# Patient Record
Sex: MALE | Race: WHITE | URBAN - METROPOLITAN AREA
[De-identification: names, ages, dates, MRNs, and addresses within clinical notes are randomized per-mention and may not be internally consistent; named-entity substitution may affect disease eponyms.]

---

## 2017-01-11 RX ORDER — OMEGA-3-ACID ETHYL ESTERS 1 G/1
CAPSULE, LIQUID FILLED ORAL
Qty: 120 CAPSULE | Refills: 1 | Status: SHIPPED | OUTPATIENT
Start: 2017-01-11 | End: 2017-08-07 | Stop reason: SDUPTHER

## 2017-02-20 RX ORDER — LISINOPRIL 10 MG/1
10 TABLET ORAL DAILY
Qty: 30 TABLET | Refills: 0 | Status: SHIPPED | OUTPATIENT
Start: 2017-02-20

## 2017-02-20 RX ORDER — ATORVASTATIN CALCIUM 40 MG/1
TABLET, FILM COATED ORAL
Qty: 30 TABLET | Refills: 0 | Status: SHIPPED | OUTPATIENT
Start: 2017-02-20 | End: 2017-03-20 | Stop reason: SDUPTHER

## 2017-04-17 RX ORDER — ATORVASTATIN CALCIUM 40 MG/1
TABLET, FILM COATED ORAL
Qty: 30 TABLET | Refills: 0 | Status: SHIPPED | OUTPATIENT
Start: 2017-04-17 | End: 2017-05-19 | Stop reason: SDUPTHER

## 2017-05-19 RX ORDER — ATORVASTATIN CALCIUM 40 MG/1
40 TABLET, FILM COATED ORAL DAILY
Qty: 30 TABLET | Refills: 0 | Status: SHIPPED | OUTPATIENT
Start: 2017-05-19 | End: 2018-01-16 | Stop reason: SDUPTHER

## 2017-06-22 LAB
ALBUMIN SERPL-MCNC: 4.1 G/DL
ALP BLD-CCNC: 75 U/L
ALT SERPL-CCNC: 24 U/L
ANA TITER: 0.2
AST SERPL-CCNC: 16 U/L
BASOPHILS ABSOLUTE: 0 /ΜL
BASOPHILS RELATIVE PERCENT: 0 %
BILIRUB SERPL-MCNC: 1.7 MG/DL (ref 0.1–1.4)
BUN BLDV-MCNC: 20 MG/DL
CALCIUM SERPL-MCNC: 99 MG/DL
CHLORIDE BLD-SCNC: 99 MMOL/L
CO2: 28 MMOL/L
CREAT SERPL-MCNC: 1.37 MG/DL
EOSINOPHILS ABSOLUTE: 0 /ΜL
EOSINOPHILS RELATIVE PERCENT: 0 %
GFR CALCULATED: ABNORMAL
GLUCOSE BLD-MCNC: 117 MG/DL
HCT VFR BLD CALC: 40 % (ref 41–53)
HEMOGLOBIN: 13.7 G/DL (ref 13.5–17.5)
LYMPHOCYTES ABSOLUTE: 1.1 /ΜL
LYMPHOCYTES RELATIVE PERCENT: 13 %
MCH RBC QN AUTO: 34 PG
MCHC RBC AUTO-ENTMCNC: ABNORMAL G/DL
MCV RBC AUTO: 93 FL
MONOCYTES ABSOLUTE: 0.4 /ΜL
MONOCYTES RELATIVE PERCENT: 4 %
NEUTROPHILS ABSOLUTE: ABNORMAL /ΜL
NEUTROPHILS RELATIVE PERCENT: ABNORMAL %
PLATELET # BLD: 229 K/ΜL
PMV BLD AUTO: ABNORMAL FL
POTASSIUM SERPL-SCNC: 4.5 MMOL/L
RBC # BLD: 4.3 10^6/ΜL
SODIUM BLD-SCNC: 135 MMOL/L
TOTAL PROTEIN: 7.5
TSH SERPL DL<=0.05 MIU/L-ACNC: 1.5 UIU/ML
WBC # BLD: 8.9 10^3/ML

## 2017-06-26 LAB
CHOLESTEROL, TOTAL: 95 MG/DL
CHOLESTEROL/HDL RATIO: NORMAL
HDLC SERPL-MCNC: 45 MG/DL (ref 35–70)
LDL CHOLESTEROL CALCULATED: 37 MG/DL (ref 0–160)
TRIGL SERPL-MCNC: NORMAL MG/DL
VLDLC SERPL CALC-MCNC: NORMAL MG/DL

## 2017-08-01 ENCOUNTER — OFFICE VISIT (OUTPATIENT)
Dept: ENDOCRINOLOGY | Age: 77
End: 2017-08-01

## 2017-08-01 VITALS
SYSTOLIC BLOOD PRESSURE: 125 MMHG | HEIGHT: 70 IN | WEIGHT: 184.8 LBS | OXYGEN SATURATION: 98 % | BODY MASS INDEX: 26.45 KG/M2 | RESPIRATION RATE: 14 BRPM | DIASTOLIC BLOOD PRESSURE: 86 MMHG | HEART RATE: 73 BPM

## 2017-08-01 DIAGNOSIS — R73.01 IMPAIRED FASTING BLOOD SUGAR: ICD-10-CM

## 2017-08-01 DIAGNOSIS — E55.9 VITAMIN D DEFICIENCY: ICD-10-CM

## 2017-08-01 DIAGNOSIS — E78.00 PURE HYPERCHOLESTEROLEMIA: Primary | ICD-10-CM

## 2017-08-01 PROCEDURE — 99214 OFFICE O/P EST MOD 30 MIN: CPT | Performed by: INTERNAL MEDICINE

## 2017-08-01 PROCEDURE — G8419 CALC BMI OUT NRM PARAM NOF/U: HCPCS | Performed by: INTERNAL MEDICINE

## 2017-08-01 PROCEDURE — 4040F PNEUMOC VAC/ADMIN/RCVD: CPT | Performed by: INTERNAL MEDICINE

## 2017-08-01 PROCEDURE — G8427 DOCREV CUR MEDS BY ELIG CLIN: HCPCS | Performed by: INTERNAL MEDICINE

## 2017-08-01 PROCEDURE — 1036F TOBACCO NON-USER: CPT | Performed by: INTERNAL MEDICINE

## 2017-08-01 PROCEDURE — 1123F ACP DISCUSS/DSCN MKR DOCD: CPT | Performed by: INTERNAL MEDICINE

## 2017-08-01 PROCEDURE — G8598 ASA/ANTIPLAT THER USED: HCPCS | Performed by: INTERNAL MEDICINE

## 2017-08-01 RX ORDER — RANITIDINE 150 MG/1
150 TABLET ORAL 2 TIMES DAILY
COMMUNITY

## 2017-08-01 RX ORDER — TRIAMCINOLONE ACETONIDE 1 MG/G
CREAM TOPICAL 2 TIMES DAILY
COMMUNITY
End: 2018-08-06 | Stop reason: ALTCHOICE

## 2017-08-01 RX ORDER — TACROLIMUS 1 MG/G
OINTMENT TOPICAL 2 TIMES DAILY
COMMUNITY
End: 2018-08-06 | Stop reason: ALTCHOICE

## 2017-08-01 RX ORDER — CETIRIZINE HYDROCHLORIDE 10 MG/1
10 TABLET ORAL DAILY
COMMUNITY

## 2017-08-07 RX ORDER — OMEGA-3-ACID ETHYL ESTERS 1 G/1
CAPSULE, LIQUID FILLED ORAL
Qty: 120 CAPSULE | Refills: 1 | Status: SHIPPED | OUTPATIENT
Start: 2017-08-07 | End: 2017-09-06 | Stop reason: SDUPTHER

## 2017-09-06 ENCOUNTER — TELEPHONE (OUTPATIENT)
Dept: ENDOCRINOLOGY | Age: 77
End: 2017-09-06

## 2017-09-06 RX ORDER — OMEGA-3-ACID ETHYL ESTERS 1 G/1
CAPSULE, LIQUID FILLED ORAL
Qty: 120 CAPSULE | Refills: 1 | Status: SHIPPED | OUTPATIENT
Start: 2017-09-06 | End: 2017-12-05 | Stop reason: SDUPTHER

## 2017-12-04 NOTE — TELEPHONE ENCOUNTER
Pt called to get a refill on Lovaza. He would like it called to [facility]. @ [phone]. If any questions or problems please call patient on his cell.

## 2017-12-05 RX ORDER — OMEGA-3-ACID ETHYL ESTERS 1 G/1
CAPSULE, LIQUID FILLED ORAL
Qty: 120 CAPSULE | Refills: 2 | Status: SHIPPED | OUTPATIENT
Start: 2017-12-05 | End: 2018-01-06 | Stop reason: SDUPTHER

## 2018-01-08 RX ORDER — OMEGA-3-ACID ETHYL ESTERS 1 G/1
CAPSULE, LIQUID FILLED ORAL
Qty: 120 CAPSULE | Refills: 1 | Status: SHIPPED | OUTPATIENT
Start: 2018-01-08 | End: 2018-02-28 | Stop reason: SDUPTHER

## 2018-01-17 RX ORDER — ATORVASTATIN CALCIUM 40 MG/1
TABLET, FILM COATED ORAL
Qty: 30 TABLET | Refills: 0 | Status: SHIPPED | OUTPATIENT
Start: 2018-01-17 | End: 2018-02-06 | Stop reason: SDUPTHER

## 2018-02-06 ENCOUNTER — OFFICE VISIT (OUTPATIENT)
Dept: ENDOCRINOLOGY | Age: 78
End: 2018-02-06

## 2018-02-06 VITALS
OXYGEN SATURATION: 100 % | BODY MASS INDEX: 26.23 KG/M2 | RESPIRATION RATE: 14 BRPM | DIASTOLIC BLOOD PRESSURE: 71 MMHG | SYSTOLIC BLOOD PRESSURE: 106 MMHG | WEIGHT: 183.2 LBS | HEIGHT: 70 IN | HEART RATE: 73 BPM

## 2018-02-06 DIAGNOSIS — I10 ESSENTIAL HYPERTENSION: ICD-10-CM

## 2018-02-06 DIAGNOSIS — R73.01 IMPAIRED FASTING BLOOD SUGAR: ICD-10-CM

## 2018-02-06 DIAGNOSIS — E55.9 VITAMIN D DEFICIENCY: ICD-10-CM

## 2018-02-06 DIAGNOSIS — E78.00 PURE HYPERCHOLESTEROLEMIA: Primary | ICD-10-CM

## 2018-02-06 DIAGNOSIS — M10.9 GOUT, UNSPECIFIED CAUSE, UNSPECIFIED CHRONICITY, UNSPECIFIED SITE: ICD-10-CM

## 2018-02-06 DIAGNOSIS — E78.00 PURE HYPERCHOLESTEROLEMIA: ICD-10-CM

## 2018-02-06 LAB
A/G RATIO: 2 (ref 1.1–2.2)
ALBUMIN SERPL-MCNC: 4.6 G/DL (ref 3.4–5)
ALP BLD-CCNC: 78 U/L (ref 40–129)
ALT SERPL-CCNC: 12 U/L (ref 10–40)
ANION GAP SERPL CALCULATED.3IONS-SCNC: 13 MMOL/L (ref 3–16)
AST SERPL-CCNC: 17 U/L (ref 15–37)
BILIRUB SERPL-MCNC: 2.1 MG/DL (ref 0–1)
BUN BLDV-MCNC: 22 MG/DL (ref 7–20)
CALCIUM SERPL-MCNC: 9.8 MG/DL (ref 8.3–10.6)
CHLORIDE BLD-SCNC: 98 MMOL/L (ref 99–110)
CHOLESTEROL, TOTAL: 95 MG/DL (ref 0–199)
CO2: 28 MMOL/L (ref 21–32)
CREAT SERPL-MCNC: 1 MG/DL (ref 0.8–1.3)
GFR AFRICAN AMERICAN: >60
GFR NON-AFRICAN AMERICAN: >60
GLOBULIN: 2.3 G/DL
GLUCOSE BLD-MCNC: 97 MG/DL (ref 70–99)
HDLC SERPL-MCNC: 41 MG/DL (ref 40–60)
LDL CHOLESTEROL CALCULATED: 42 MG/DL
POTASSIUM SERPL-SCNC: 4.4 MMOL/L (ref 3.5–5.1)
SODIUM BLD-SCNC: 139 MMOL/L (ref 136–145)
TOTAL PROTEIN: 6.9 G/DL (ref 6.4–8.2)
TRIGL SERPL-MCNC: 60 MG/DL (ref 0–150)
URIC ACID, SERUM: 4.9 MG/DL (ref 3.5–7.2)
VITAMIN D 25-HYDROXY: 35.9 NG/ML
VLDLC SERPL CALC-MCNC: 12 MG/DL

## 2018-02-06 PROCEDURE — G8484 FLU IMMUNIZE NO ADMIN: HCPCS | Performed by: INTERNAL MEDICINE

## 2018-02-06 PROCEDURE — G8419 CALC BMI OUT NRM PARAM NOF/U: HCPCS | Performed by: INTERNAL MEDICINE

## 2018-02-06 PROCEDURE — 99214 OFFICE O/P EST MOD 30 MIN: CPT | Performed by: INTERNAL MEDICINE

## 2018-02-06 PROCEDURE — G8427 DOCREV CUR MEDS BY ELIG CLIN: HCPCS | Performed by: INTERNAL MEDICINE

## 2018-02-06 PROCEDURE — 1123F ACP DISCUSS/DSCN MKR DOCD: CPT | Performed by: INTERNAL MEDICINE

## 2018-02-06 PROCEDURE — G8598 ASA/ANTIPLAT THER USED: HCPCS | Performed by: INTERNAL MEDICINE

## 2018-02-06 PROCEDURE — 1036F TOBACCO NON-USER: CPT | Performed by: INTERNAL MEDICINE

## 2018-02-06 PROCEDURE — 4040F PNEUMOC VAC/ADMIN/RCVD: CPT | Performed by: INTERNAL MEDICINE

## 2018-02-06 RX ORDER — ATORVASTATIN CALCIUM 40 MG/1
TABLET, FILM COATED ORAL
Qty: 30 TABLET | Refills: 5 | Status: SHIPPED | OUTPATIENT
Start: 2018-02-06 | End: 2018-08-06 | Stop reason: SDUPTHER

## 2018-02-06 NOTE — PROGRESS NOTES
10/20/16 190 lb 9.6 oz (86.5 kg)     BP Readings from Last 3 Encounters:   02/06/18 106/71   08/01/17 125/86   10/20/16 115/78        Past Medical History:   Diagnosis Date    Atrial fibrillation ([address] 2000    pacemaker    CAD (coronary artery disease)     angiogram 12/2010 no stents    Coagulopathy (HCC)     4G/5G ELISSA-1, Heterozygous Prothrombin    Hyperlipidemia     Hypertension     MI (myocardial infarction) 1979    Unspecified sleep apnea      Past Surgical History:   Procedure Laterality Date    ANGIOPLASTY  1986    CARDIAC CATHETERIZATION  12/2010     History reviewed. No pertinent family history. History   Smoking Status    Former Smoker    Years: 31.00   Smokeless Tobacco    Never Used      History   Alcohol Use No       HPI    This is a 68 yrs old male who is here for a follow-up for management of hyperlipidemia, vit d def, hyperuricemia. PCP Antelmo Soto    Was previously seeing Dr. Rosamaria Jimenez at the [facility]. He has a PMH of  A. Fibrillation (On Xarelto), Left Ventricular dysfunction (s/p pacemaker), CAD (MI, LAD 1979, then angioplasty 1986), KARISSA (CPAP/BIPAP), Hyperuricemia.      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 On Xarelto 20mg qD. No strokes/TIA.     Denies myalgias, CP and SOB, GI symptoms.     Hyperlipidemia : Lovaza 4g daily, Lipitor 40mg daily,   Vit D def. Vit D 7230 IU daily,   Folic acid 1mg daily. Hyperuricemia : allopurinol. Review of Systems   Constitutional: Positive for malaise/fatigue. Negative for chills, diaphoresis, fever and weight loss. Eyes: Negative for blurred vision, double vision and photophobia. Respiratory: Negative for cough and hemoptysis. Cardiovascular: Negative for chest pain, palpitations and orthopnea. Genitourinary: Negative for dysuria, flank pain, frequency, hematuria and urgency. Musculoskeletal: Negative for back pain, falls, joint pain, myalgias and neck pain.    Skin: Negative for itching and rash. Neurological: Negative for dizziness, tingling, tremors, sensory change, speech change, focal weakness, seizures, loss of consciousness and headaches. Endo/Heme/Allergies: Negative for environmental allergies and polydipsia. Does not bruise/bleed easily. Psychiatric/Behavioral: Negative for depression, hallucinations, memory loss, substance abuse and suicidal ideas. The patient is not nervous/anxious and does not have insomnia. Physical Exam   Constitutional: He is oriented to person, place, and time. He appears well-developed. No distress. HENT:   Mouth/Throat: Oropharynx is clear and moist.   Eyes: EOM are normal.   Neck: No thyromegaly present. Cardiovascular: Normal rate and normal heart sounds. Pulmonary/Chest: Effort normal. No respiratory distress. He has no wheezes. Abdominal: Soft. Bowel sounds are normal. There is no tenderness. Musculoskeletal: He exhibits no edema. Neurological: He is alert and oriented to person, place, and time. Skin: Skin is warm and dry. He is not diaphoretic. Psychiatric: His behavior is normal. Thought content normal.     No visits with results within 1 Month(s) from this visit.    Latest known visit with results is:   Abstract on 07/03/2017   Component Date Value Ref Range Status    DONITA Titer 06/22/2017 0.2   Final    TSH 06/22/2017 1.503  uIU/mL Final    WBC 06/22/2017 8.9  10^3/mL Final    Hemoglobin 06/22/2017 13.7  13.5 - 17.5 g/dL Final    Hematocrit 06/22/2017 40* 41 - 53 % Final    Platelets 92/01/3469 229  K/µL Final    Lymphocytes % 06/22/2017 13  % Final    Monocytes % 06/22/2017 4  % Final    Eosinophils % 06/22/2017 0  % Final    Basophils % 06/22/2017 0  % Final    Lymphocytes # 06/22/2017 1.1  /µL Final    Monocytes # 06/22/2017 0.4  /µL Final    Eosinophils # 06/22/2017 0  /µL Final    Basophils # 06/22/2017 0  /µL Final    RBC 06/22/2017 4.30  10^6/µL Final    MCV 06/22/2017 93  fL Final    MCH 06/22/2017 34  pg Final    Sodium 06/22/2017 135  mmol/L Final    Chloride 06/22/2017 99  mmol/L Final    Potassium 06/22/2017 4.5  mmol/L Final    BUN 06/22/2017 20  mg/dL Final    CREATININE 06/22/2017 1.37   Final    Glucose 06/22/2017 117  mg/dL Final    AST 06/22/2017 16  U/L Final    ALT 06/22/2017 24  U/L Final    Calcium 06/22/2017 99  mg/dL Final    Total Protein 06/22/2017 7.5   Final    CO2 06/22/2017 28  mmol/L Final    Alb 06/22/2017 4.1   Final    Alkaline Phosphatase 06/22/2017 75  U/L Final    Total Bilirubin 06/22/2017 1.70* 0.1 - 1.4 mg/dL Final    Cholesterol, Total 06/26/2017 95  mg/dL Final    HDL 06/26/2017 45  35 - 70 mg/dL Final    LDL Calculated 06/26/2017 37  0 - 160 mg/dL Final       Assessment/Plan      1. Hyperlipidemia    This 68 yrs old male has h/o hyperlipidemia. On lipitor, Pérez    Will repeat labs    2. Vit D def On replacement. Levels normal in 10/16. 3. Impaired fasting glucose. 117. Discussed life style changes      4. Hyperuricemia. On allopurinol. 5. CAD/Afib/prothrombin gene mutation. On xarelto. As per cardiology. 6. Sleep Apnea. As per pulmonology medicine. 7. Dermatitis/Eczema. On tacrolimus, kenalog. He is living in [place] now. Travels to different towns  Is seen when he is in town. More than 25 minutess spent with patient with > 50 % time in counseling and coordination of care.

## 2018-02-07 LAB
ESTIMATED AVERAGE GLUCOSE: 122.6 MG/DL
HBA1C MFR BLD: 5.9 %

## 2018-02-28 RX ORDER — OMEGA-3-ACID ETHYL ESTERS 1 G/1
CAPSULE, LIQUID FILLED ORAL
Qty: 120 CAPSULE | Refills: 1 | Status: SHIPPED | OUTPATIENT
Start: 2018-02-28 | End: 2018-06-04 | Stop reason: SDUPTHER

## 2018-06-04 RX ORDER — OMEGA-3-ACID ETHYL ESTERS 1 G/1
CAPSULE, LIQUID FILLED ORAL
Qty: 120 CAPSULE | Refills: 3 | Status: SHIPPED | OUTPATIENT
Start: 2018-06-04 | End: 2018-08-01 | Stop reason: SDUPTHER

## 2018-08-01 RX ORDER — OMEGA-3-ACID ETHYL ESTERS 1 G/1
CAPSULE, LIQUID FILLED ORAL
Qty: 120 CAPSULE | Refills: 0 | Status: SHIPPED | OUTPATIENT
Start: 2018-08-01 | End: 2018-08-06 | Stop reason: SDUPTHER

## 2018-08-06 ENCOUNTER — OFFICE VISIT (OUTPATIENT)
Dept: ENDOCRINOLOGY | Age: 78
End: 2018-08-06

## 2018-08-06 VITALS
OXYGEN SATURATION: 95 % | DIASTOLIC BLOOD PRESSURE: 70 MMHG | HEART RATE: 65 BPM | RESPIRATION RATE: 12 BRPM | BODY MASS INDEX: 25.51 KG/M2 | HEIGHT: 70 IN | WEIGHT: 178.2 LBS | SYSTOLIC BLOOD PRESSURE: 103 MMHG

## 2018-08-06 DIAGNOSIS — I10 ESSENTIAL HYPERTENSION: ICD-10-CM

## 2018-08-06 DIAGNOSIS — R73.03 BORDERLINE DIABETES MELLITUS: ICD-10-CM

## 2018-08-06 DIAGNOSIS — E55.9 VITAMIN D DEFICIENCY: ICD-10-CM

## 2018-08-06 DIAGNOSIS — E78.00 PURE HYPERCHOLESTEROLEMIA: Primary | ICD-10-CM

## 2018-08-06 DIAGNOSIS — E78.00 PURE HYPERCHOLESTEROLEMIA: ICD-10-CM

## 2018-08-06 LAB
A/G RATIO: 2.2 (ref 1.1–2.2)
ALBUMIN SERPL-MCNC: 4.6 G/DL (ref 3.4–5)
ALP BLD-CCNC: 74 U/L (ref 40–129)
ALT SERPL-CCNC: 12 U/L (ref 10–40)
ANION GAP SERPL CALCULATED.3IONS-SCNC: 12 MMOL/L (ref 3–16)
AST SERPL-CCNC: 16 U/L (ref 15–37)
BILIRUB SERPL-MCNC: 2.5 MG/DL (ref 0–1)
BUN BLDV-MCNC: 22 MG/DL (ref 7–20)
CALCIUM SERPL-MCNC: 9.6 MG/DL (ref 8.3–10.6)
CHLORIDE BLD-SCNC: 100 MMOL/L (ref 99–110)
CHOLESTEROL, TOTAL: 88 MG/DL (ref 0–199)
CO2: 28 MMOL/L (ref 21–32)
CREAT SERPL-MCNC: 0.9 MG/DL (ref 0.8–1.3)
GFR AFRICAN AMERICAN: >60
GFR NON-AFRICAN AMERICAN: >60
GLOBULIN: 2.1 G/DL
GLUCOSE BLD-MCNC: 105 MG/DL (ref 70–99)
HDLC SERPL-MCNC: 38 MG/DL (ref 40–60)
LDL CHOLESTEROL CALCULATED: 37 MG/DL
POTASSIUM SERPL-SCNC: 4.5 MMOL/L (ref 3.5–5.1)
SODIUM BLD-SCNC: 140 MMOL/L (ref 136–145)
TOTAL PROTEIN: 6.7 G/DL (ref 6.4–8.2)
TRIGL SERPL-MCNC: 63 MG/DL (ref 0–150)
VLDLC SERPL CALC-MCNC: 13 MG/DL

## 2018-08-06 PROCEDURE — G8419 CALC BMI OUT NRM PARAM NOF/U: HCPCS | Performed by: INTERNAL MEDICINE

## 2018-08-06 PROCEDURE — 1123F ACP DISCUSS/DSCN MKR DOCD: CPT | Performed by: INTERNAL MEDICINE

## 2018-08-06 PROCEDURE — 1101F PT FALLS ASSESS-DOCD LE1/YR: CPT | Performed by: INTERNAL MEDICINE

## 2018-08-06 PROCEDURE — 1036F TOBACCO NON-USER: CPT | Performed by: INTERNAL MEDICINE

## 2018-08-06 PROCEDURE — 4040F PNEUMOC VAC/ADMIN/RCVD: CPT | Performed by: INTERNAL MEDICINE

## 2018-08-06 PROCEDURE — G8427 DOCREV CUR MEDS BY ELIG CLIN: HCPCS | Performed by: INTERNAL MEDICINE

## 2018-08-06 PROCEDURE — G8598 ASA/ANTIPLAT THER USED: HCPCS | Performed by: INTERNAL MEDICINE

## 2018-08-06 PROCEDURE — 99213 OFFICE O/P EST LOW 20 MIN: CPT | Performed by: INTERNAL MEDICINE

## 2018-08-06 RX ORDER — ATORVASTATIN CALCIUM 40 MG/1
TABLET, FILM COATED ORAL
Qty: 30 TABLET | Refills: 5 | Status: SHIPPED | OUTPATIENT
Start: 2018-08-06 | End: 2019-03-07 | Stop reason: SDUPTHER

## 2018-08-06 RX ORDER — OMEGA-3-ACID ETHYL ESTERS 1 G/1
CAPSULE, LIQUID FILLED ORAL
Qty: 120 CAPSULE | Refills: 2 | Status: SHIPPED | OUTPATIENT
Start: 2018-08-06 | End: 2019-02-04 | Stop reason: SDUPTHER

## 2018-08-06 NOTE — PROGRESS NOTES
Endocrinology  Sakshi Hill M.D. Phone: [phone]   FAX: [phone]       Ruben Pastor   YOB: 1940    Date of Visit:  8/6/2018    Allergies   Allergen Reactions    Pcn [Penicillins]      Outpatient Prescriptions Marked as Taking for the 8/6/18 encounter (Office Visit) with Saul Becker MD   Medication Sig Dispense Refill    omega-3 acid ethyl esters (LOVAZA) 1 g capsule TAKE TWO CAPSULES BY MOUTH TWICE A  capsule 0    atorvastatin (LIPITOR) 40 MG tablet TAKE ONE TABLET BY MOUTH ONE TIME DAILY 30 tablet 5    lisinopril (PRINIVIL;ZESTRIL) 10 MG tablet Take 1 tablet by mouth daily 30 tablet 0    rivaroxaban (XARELTO) 20 MG TABS tablet TAKE 1 TABLET BY MOUTH DAILY. 90 tablet 1    spironolactone-hydrochlorothiazide (ALDACTAZIDE) 25-25 MG per tablet       omeprazole (PRILOSEC) 20 MG capsule       allopurinol (ZYLOPRIM) 100 MG tablet Take 1 tablet by mouth 2 times daily.  Ascorbic Acid (VITAMIN C) 500 MG tablet Take 500 mg by mouth daily.  Cholecalciferol (VITAMIN D) 1000 UNIT CAPS Take 1,000 Units by mouth daily.  folic acid (FOLVITE) 1 MG tablet Take 1 mg by mouth daily       carvedilol (COREG) 25 MG tablet Take 25 mg by mouth 2 times daily (with meals). Vitals:    08/06/18 1107   BP: 103/70   Site: Left Arm   Position: Sitting   Cuff Size: Medium Adult   Pulse: 65   Resp: 12   SpO2: 95%   Weight: 178 lb 3.2 oz (80.8 kg)   Height: 5' 10\" (1.778 m)     Body mass index is 25.57 kg/m².      Wt Readings from Last 3 Encounters:   08/06/18 178 lb 3.2 oz (80.8 kg)   02/06/18 183 lb 3.2 oz (83.1 kg)   08/01/17 184 lb 12.8 oz (83.8 kg)     BP Readings from Last 3 Encounters:   08/06/18 103/70   02/06/18 106/71   08/01/17 125/86        Past Medical History:   Diagnosis Date    Atrial fibrillation ([address] 2000    pacemaker    CAD (coronary artery disease)     angiogram 12/2010 no stents    Coagulopathy (HCC)     4G/5G ELISSA-1, Heterozygous depression, hallucinations, memory loss, substance abuse and suicidal ideas. The patient is not nervous/anxious and does not have insomnia. Physical Exam   Constitutional: He is oriented to person, place, and time. He appears well-developed. No distress. HENT:   Mouth/Throat: Oropharynx is clear and moist.   Eyes: EOM are normal.   Neck: No thyromegaly present. Cardiovascular: Normal rate and normal heart sounds. Pulmonary/Chest: Effort normal. No respiratory distress. He has no wheezes. Abdominal: Soft. Bowel sounds are normal. There is no tenderness. Musculoskeletal: He exhibits no edema. Neurological: He is alert and oriented to person, place, and time. Skin: Skin is warm and dry. He is not diaphoretic. Psychiatric: His behavior is normal. Thought content normal.         No visits with results within 1 Month(s) from this visit.    Latest known visit with results is:   Orders Only on 02/06/2018   Component Date Value Ref Range Status    Hemoglobin A1C 02/06/2018 5.9  See comment % Final    Comment: Comment:  Diagnosis of Diabetes: > or = 6.5%  Increased risk of diabetes (Prediabetes): 5.7-6.4%  Glycemic Control: Nonpregnant Adults: <7.0%                    Pregnant: <6.0%        eAG 02/06/2018 122.6  mg/dL Final    Cholesterol, Total 02/06/2018 95  0 - 199 mg/dL Final    Triglycerides 02/06/2018 60  0 - 150 mg/dL Final    HDL 02/06/2018 41  40 - 60 mg/dL Final    LDL Calculated 02/06/2018 42  <100 mg/dL Final    VLDL Cholesterol Calculated 02/06/2018 12  Not Established mg/dL Final    Sodium 02/06/2018 139  136 - 145 mmol/L Final    Potassium 02/06/2018 4.4  3.5 - 5.1 mmol/L Final    Chloride 02/06/2018 98* 99 - 110 mmol/L Final    CO2 02/06/2018 28  21 - 32 mmol/L Final    Anion Gap 02/06/2018 13  3 - 16 Final    Glucose 02/06/2018 97  70 - 99 mg/dL Final    BUN 02/06/2018 22* 7 - 20 mg/dL Final    CREATININE 02/06/2018 1.0  0.8 - 1.3 mg/dL Final    GFR Non-African American 02/06/2018 >60  >60 Final    Comment: >60 mL/min/1.73m2 EGFR, calc. for ages 25 and older using the  MDRD formula (not corrected for weight), is valid for stable  renal function.  GFR  02/06/2018 >60  >60 Final    Comment: Chronic Kidney Disease: less than 60 ml/min/1.73 sq.m. Kidney Failure: less than 15 ml/min/1.73 sq.m. Results valid for patients 18 years and older.  Calcium 02/06/2018 9.8  8.3 - 10.6 mg/dL Final    Total Protein 02/06/2018 6.9  6.4 - 8.2 g/dL Final    Alb 02/06/2018 4.6  3.4 - 5.0 g/dL Final    Albumin/Globulin Ratio 02/06/2018 2.0  1.1 - 2.2 Final    Total Bilirubin 02/06/2018 2.1* 0.0 - 1.0 mg/dL Final    Alkaline Phosphatase 02/06/2018 78  40 - 129 U/L Final    ALT 02/06/2018 12  10 - 40 U/L Final    AST 02/06/2018 17  15 - 37 U/L Final    Globulin 02/06/2018 2.3  g/dL Final    Vit D, 25-Hydroxy 02/06/2018 35.9  >=30 ng/mL Final    Comment: <=20 ng/mL. ........... [employer] Deficient  21-29 ng/mL. ......... [facility] Insufficient  >=30 ng/mL. ........ [facility] Sufficient      Uric Acid, Serum 02/06/2018 4.9  3.5 - 7.2 mg/dL Final       Assessment/Plan      1. Hyperlipidemia    This 66 yrs old male has h/o hyperlipidemia. On lipitor, Lovaza    02/18  HDL 41  LDL 42  TGD 60    Will repeat labs    2. Vit D def On replacement. Levels normal ( 35.9 ) in 02/18    3. Impaired fasting glucose. A1c 5.9 %     Will repeat today. 4. Hyperuricemia. On allopurinol. 5. CAD/Afib/prothrombin gene mutation. On xarelto. As per cardiology. 6. Sleep Apnea. As per pulmonology medicine. 7. Dermatitis/Eczema. On tacrolimus, kenalog. He is living in [place] now. Is seen when he is in town. Will send a letter with results.

## 2018-08-07 LAB
ESTIMATED AVERAGE GLUCOSE: 116.9 MG/DL
HBA1C MFR BLD: 5.7 %

## 2019-02-04 RX ORDER — OMEGA-3-ACID ETHYL ESTERS 1 G/1
CAPSULE, LIQUID FILLED ORAL
Qty: 120 CAPSULE | Refills: 2 | Status: SHIPPED | OUTPATIENT
Start: 2019-02-04 | End: 2019-03-07 | Stop reason: SDUPTHER

## 2019-03-01 ENCOUNTER — TELEPHONE (OUTPATIENT)
Dept: ENDOCRINOLOGY | Age: 79
End: 2019-03-01

## 2019-03-01 DIAGNOSIS — R73.01 IMPAIRED FASTING BLOOD SUGAR: ICD-10-CM

## 2019-03-01 DIAGNOSIS — E78.00 PURE HYPERCHOLESTEROLEMIA: Primary | ICD-10-CM

## 2019-03-01 DIAGNOSIS — E55.9 VITAMIN D DEFICIENCY: ICD-10-CM

## 2019-03-01 DIAGNOSIS — E79.0 HYPERURICEMIA: ICD-10-CM

## 2019-03-06 DIAGNOSIS — R73.01 IMPAIRED FASTING BLOOD SUGAR: ICD-10-CM

## 2019-03-06 DIAGNOSIS — E78.00 PURE HYPERCHOLESTEROLEMIA: ICD-10-CM

## 2019-03-06 DIAGNOSIS — E55.9 VITAMIN D DEFICIENCY: ICD-10-CM

## 2019-03-06 DIAGNOSIS — E79.0 HYPERURICEMIA: ICD-10-CM

## 2019-03-06 LAB
A/G RATIO: 2.3 (ref 1.1–2.2)
ALBUMIN SERPL-MCNC: 4.4 G/DL (ref 3.4–5)
ALP BLD-CCNC: 74 U/L (ref 40–129)
ALT SERPL-CCNC: 11 U/L (ref 10–40)
ANION GAP SERPL CALCULATED.3IONS-SCNC: 14 MMOL/L (ref 3–16)
AST SERPL-CCNC: 16 U/L (ref 15–37)
BILIRUB SERPL-MCNC: 1.8 MG/DL (ref 0–1)
BUN BLDV-MCNC: 16 MG/DL (ref 7–20)
CALCIUM SERPL-MCNC: 9.4 MG/DL (ref 8.3–10.6)
CHLORIDE BLD-SCNC: 101 MMOL/L (ref 99–110)
CHOLESTEROL, TOTAL: 80 MG/DL (ref 0–199)
CO2: 26 MMOL/L (ref 21–32)
CREAT SERPL-MCNC: 0.8 MG/DL (ref 0.8–1.3)
GFR AFRICAN AMERICAN: >60
GFR NON-AFRICAN AMERICAN: >60
GLOBULIN: 1.9 G/DL
GLUCOSE BLD-MCNC: 105 MG/DL (ref 70–99)
HDLC SERPL-MCNC: 38 MG/DL (ref 40–60)
LDL CHOLESTEROL CALCULATED: 31 MG/DL
POTASSIUM SERPL-SCNC: 4.7 MMOL/L (ref 3.5–5.1)
SODIUM BLD-SCNC: 141 MMOL/L (ref 136–145)
TOTAL PROTEIN: 6.3 G/DL (ref 6.4–8.2)
TRIGL SERPL-MCNC: 57 MG/DL (ref 0–150)
URIC ACID, SERUM: 4.4 MG/DL (ref 3.5–7.2)
VITAMIN D 25-HYDROXY: 35.6 NG/ML
VLDLC SERPL CALC-MCNC: 11 MG/DL

## 2019-03-07 ENCOUNTER — OFFICE VISIT (OUTPATIENT)
Dept: ENDOCRINOLOGY | Age: 79
End: 2019-03-07
Payer: MEDICARE

## 2019-03-07 VITALS
RESPIRATION RATE: 16 BRPM | HEIGHT: 70 IN | SYSTOLIC BLOOD PRESSURE: 110 MMHG | OXYGEN SATURATION: 99 % | BODY MASS INDEX: 26.2 KG/M2 | DIASTOLIC BLOOD PRESSURE: 78 MMHG | WEIGHT: 183 LBS | HEART RATE: 70 BPM

## 2019-03-07 DIAGNOSIS — E79.0 HYPERURICEMIA: ICD-10-CM

## 2019-03-07 DIAGNOSIS — R73.01 IMPAIRED FASTING BLOOD SUGAR: ICD-10-CM

## 2019-03-07 DIAGNOSIS — E78.00 PURE HYPERCHOLESTEROLEMIA: Primary | ICD-10-CM

## 2019-03-07 DIAGNOSIS — E55.9 VITAMIN D DEFICIENCY: ICD-10-CM

## 2019-03-07 LAB
ESTIMATED AVERAGE GLUCOSE: 122.6 MG/DL
HBA1C MFR BLD: 5.9 %

## 2019-03-07 PROCEDURE — G8484 FLU IMMUNIZE NO ADMIN: HCPCS | Performed by: INTERNAL MEDICINE

## 2019-03-07 PROCEDURE — G8427 DOCREV CUR MEDS BY ELIG CLIN: HCPCS | Performed by: INTERNAL MEDICINE

## 2019-03-07 PROCEDURE — 1101F PT FALLS ASSESS-DOCD LE1/YR: CPT | Performed by: INTERNAL MEDICINE

## 2019-03-07 PROCEDURE — 4040F PNEUMOC VAC/ADMIN/RCVD: CPT | Performed by: INTERNAL MEDICINE

## 2019-03-07 PROCEDURE — 1036F TOBACCO NON-USER: CPT | Performed by: INTERNAL MEDICINE

## 2019-03-07 PROCEDURE — 99214 OFFICE O/P EST MOD 30 MIN: CPT | Performed by: INTERNAL MEDICINE

## 2019-03-07 PROCEDURE — 1123F ACP DISCUSS/DSCN MKR DOCD: CPT | Performed by: INTERNAL MEDICINE

## 2019-03-07 PROCEDURE — G8598 ASA/ANTIPLAT THER USED: HCPCS | Performed by: INTERNAL MEDICINE

## 2019-03-07 PROCEDURE — G8419 CALC BMI OUT NRM PARAM NOF/U: HCPCS | Performed by: INTERNAL MEDICINE

## 2019-03-07 RX ORDER — OMEGA-3-ACID ETHYL ESTERS 1 G/1
CAPSULE, LIQUID FILLED ORAL
Qty: 120 CAPSULE | Refills: 5 | Status: SHIPPED | OUTPATIENT
Start: 2019-03-07 | End: 2019-03-07 | Stop reason: SDUPTHER

## 2019-03-07 RX ORDER — ATORVASTATIN CALCIUM 40 MG/1
TABLET, FILM COATED ORAL
Qty: 30 TABLET | Refills: 5 | Status: SHIPPED | OUTPATIENT
Start: 2019-03-07 | End: 2019-04-10 | Stop reason: SDUPTHER

## 2019-03-07 RX ORDER — ATORVASTATIN CALCIUM 40 MG/1
TABLET, FILM COATED ORAL
Qty: 30 TABLET | Refills: 5 | Status: SHIPPED | OUTPATIENT
Start: 2019-03-07 | End: 2019-03-07 | Stop reason: SDUPTHER

## 2019-03-07 RX ORDER — OMEGA-3-ACID ETHYL ESTERS 1 G/1
CAPSULE, LIQUID FILLED ORAL
Qty: 120 CAPSULE | Refills: 5 | Status: SHIPPED | OUTPATIENT
Start: 2019-03-07 | End: 2019-09-10 | Stop reason: SDUPTHER

## 2019-04-10 RX ORDER — ATORVASTATIN CALCIUM 40 MG/1
TABLET, FILM COATED ORAL
Qty: 30 TABLET | Refills: 2 | Status: SHIPPED | OUTPATIENT
Start: 2019-04-10 | End: 2020-02-10

## 2019-09-10 DIAGNOSIS — E78.00 PURE HYPERCHOLESTEROLEMIA: Primary | ICD-10-CM

## 2019-09-10 RX ORDER — OMEGA-3-ACID ETHYL ESTERS 1 G/1
CAPSULE, LIQUID FILLED ORAL
Qty: 120 CAPSULE | Refills: 5 | Status: SHIPPED | OUTPATIENT
Start: 2019-09-10 | End: 2020-05-04

## 2019-10-07 RX ORDER — OMEGA-3-ACID ETHYL ESTERS 1 G/1
CAPSULE, LIQUID FILLED ORAL
Qty: 120 CAPSULE | Refills: 5 | Status: SHIPPED | OUTPATIENT
Start: 2019-10-07 | End: 2019-11-12 | Stop reason: SDUPTHER

## 2019-11-06 DIAGNOSIS — E78.00 PURE HYPERCHOLESTEROLEMIA: ICD-10-CM

## 2019-11-06 DIAGNOSIS — E79.0 HYPERURICEMIA: ICD-10-CM

## 2019-11-06 DIAGNOSIS — R73.01 IMPAIRED FASTING BLOOD SUGAR: ICD-10-CM

## 2019-11-06 LAB
A/G RATIO: 2.3 (ref 1.1–2.2)
ALBUMIN SERPL-MCNC: 4.5 G/DL (ref 3.4–5)
ALP BLD-CCNC: 68 U/L (ref 40–129)
ALT SERPL-CCNC: 14 U/L (ref 10–40)
ANION GAP SERPL CALCULATED.3IONS-SCNC: 12 MMOL/L (ref 3–16)
AST SERPL-CCNC: 15 U/L (ref 15–37)
BILIRUB SERPL-MCNC: 2 MG/DL (ref 0–1)
BUN BLDV-MCNC: 19 MG/DL (ref 7–20)
CALCIUM SERPL-MCNC: 9.6 MG/DL (ref 8.3–10.6)
CHLORIDE BLD-SCNC: 100 MMOL/L (ref 99–110)
CHOLESTEROL, TOTAL: 90 MG/DL (ref 0–199)
CO2: 27 MMOL/L (ref 21–32)
CREAT SERPL-MCNC: 0.9 MG/DL (ref 0.8–1.3)
ESTIMATED AVERAGE GLUCOSE: 119.8 MG/DL
GFR AFRICAN AMERICAN: >60
GFR NON-AFRICAN AMERICAN: >60
GLOBULIN: 2 G/DL
GLUCOSE BLD-MCNC: 105 MG/DL (ref 70–99)
HBA1C MFR BLD: 5.8 %
HDLC SERPL-MCNC: 45 MG/DL (ref 40–60)
LDL CHOLESTEROL CALCULATED: 29 MG/DL
POTASSIUM SERPL-SCNC: 5 MMOL/L (ref 3.5–5.1)
SODIUM BLD-SCNC: 139 MMOL/L (ref 136–145)
TOTAL PROTEIN: 6.5 G/DL (ref 6.4–8.2)
TRIGL SERPL-MCNC: 79 MG/DL (ref 0–150)
URIC ACID, SERUM: 4.8 MG/DL (ref 3.5–7.2)
VLDLC SERPL CALC-MCNC: 16 MG/DL

## 2019-11-08 LAB — TSH, 3RD GENERATION: 2.86 MU/L (ref 0.3–4)

## 2019-11-11 DIAGNOSIS — E78.00 PURE HYPERCHOLESTEROLEMIA: Primary | ICD-10-CM

## 2019-11-11 RX ORDER — ATORVASTATIN CALCIUM 40 MG/1
TABLET, FILM COATED ORAL
Qty: 30 TABLET | Refills: 5 | Status: SHIPPED | OUTPATIENT
Start: 2019-11-11 | End: 2019-11-12 | Stop reason: SDUPTHER

## 2019-11-12 ENCOUNTER — OFFICE VISIT (OUTPATIENT)
Dept: ENDOCRINOLOGY | Age: 79
End: 2019-11-12
Payer: MEDICARE

## 2019-11-12 VITALS
HEART RATE: 78 BPM | SYSTOLIC BLOOD PRESSURE: 118 MMHG | BODY MASS INDEX: 26.57 KG/M2 | OXYGEN SATURATION: 100 % | DIASTOLIC BLOOD PRESSURE: 66 MMHG | WEIGHT: 185.6 LBS | HEIGHT: 70 IN

## 2019-11-12 DIAGNOSIS — R73.03 BORDERLINE DIABETES MELLITUS: ICD-10-CM

## 2019-11-12 DIAGNOSIS — E55.9 VITAMIN D DEFICIENCY: ICD-10-CM

## 2019-11-12 DIAGNOSIS — E78.00 PURE HYPERCHOLESTEROLEMIA: Primary | ICD-10-CM

## 2019-11-12 DIAGNOSIS — R73.01 IMPAIRED FASTING BLOOD SUGAR: ICD-10-CM

## 2019-11-12 PROCEDURE — G8484 FLU IMMUNIZE NO ADMIN: HCPCS | Performed by: INTERNAL MEDICINE

## 2019-11-12 PROCEDURE — G8427 DOCREV CUR MEDS BY ELIG CLIN: HCPCS | Performed by: INTERNAL MEDICINE

## 2019-11-12 PROCEDURE — 99213 OFFICE O/P EST LOW 20 MIN: CPT | Performed by: INTERNAL MEDICINE

## 2019-11-12 PROCEDURE — G8417 CALC BMI ABV UP PARAM F/U: HCPCS | Performed by: INTERNAL MEDICINE

## 2019-11-12 PROCEDURE — 1036F TOBACCO NON-USER: CPT | Performed by: INTERNAL MEDICINE

## 2019-11-12 PROCEDURE — 1123F ACP DISCUSS/DSCN MKR DOCD: CPT | Performed by: INTERNAL MEDICINE

## 2019-11-12 PROCEDURE — G8598 ASA/ANTIPLAT THER USED: HCPCS | Performed by: INTERNAL MEDICINE

## 2019-11-12 PROCEDURE — 4040F PNEUMOC VAC/ADMIN/RCVD: CPT | Performed by: INTERNAL MEDICINE

## 2020-02-10 RX ORDER — ATORVASTATIN CALCIUM 40 MG/1
TABLET, FILM COATED ORAL
Qty: 30 TABLET | Refills: 2 | Status: SHIPPED | OUTPATIENT
Start: 2020-02-10 | End: 2020-05-11

## 2020-05-04 RX ORDER — OMEGA-3-ACID ETHYL ESTERS 1 G/1
CAPSULE, LIQUID FILLED ORAL
Qty: 120 CAPSULE | Refills: 0 | Status: SHIPPED | OUTPATIENT
Start: 2020-05-04 | End: 2020-06-08

## 2020-05-11 RX ORDER — ATORVASTATIN CALCIUM 40 MG/1
TABLET, FILM COATED ORAL
Qty: 30 TABLET | Refills: 2 | Status: SHIPPED | OUTPATIENT
Start: 2020-05-11 | End: 2020-12-07

## 2020-06-08 ENCOUNTER — TELEPHONE (OUTPATIENT)
Dept: ENDOCRINOLOGY | Age: 80
End: 2020-06-08

## 2020-06-08 RX ORDER — OMEGA-3-ACID ETHYL ESTERS 1 G/1
CAPSULE, LIQUID FILLED ORAL
Qty: 120 CAPSULE | Refills: 5 | Status: SHIPPED | OUTPATIENT
Start: 2020-06-08 | End: 2020-12-07

## 2020-06-08 NOTE — TELEPHONE ENCOUNTER
Pt would like omega  3 to be sent into [facility].  Please let know when sent

## 2020-06-19 DIAGNOSIS — E78.00 PURE HYPERCHOLESTEROLEMIA: ICD-10-CM

## 2020-06-19 DIAGNOSIS — R73.01 IMPAIRED FASTING BLOOD SUGAR: ICD-10-CM

## 2020-06-19 DIAGNOSIS — R73.03 BORDERLINE DIABETES MELLITUS: ICD-10-CM

## 2020-06-19 DIAGNOSIS — E55.9 VITAMIN D DEFICIENCY: ICD-10-CM

## 2020-06-19 LAB
A/G RATIO: 2.2 (ref 1.1–2.2)
ALBUMIN SERPL-MCNC: 4.4 G/DL (ref 3.4–5)
ALP BLD-CCNC: 70 U/L (ref 40–129)
ALT SERPL-CCNC: 11 U/L (ref 10–40)
ANION GAP SERPL CALCULATED.3IONS-SCNC: 14 MMOL/L (ref 3–16)
AST SERPL-CCNC: 14 U/L (ref 15–37)
BILIRUB SERPL-MCNC: 1.3 MG/DL (ref 0–1)
BUN BLDV-MCNC: 22 MG/DL (ref 7–20)
CALCIUM SERPL-MCNC: 9.1 MG/DL (ref 8.3–10.6)
CHLORIDE BLD-SCNC: 100 MMOL/L (ref 99–110)
CHOLESTEROL, TOTAL: 76 MG/DL (ref 0–199)
CO2: 26 MMOL/L (ref 21–32)
CREAT SERPL-MCNC: 0.9 MG/DL (ref 0.8–1.3)
ESTIMATED AVERAGE GLUCOSE: 122.6 MG/DL
GFR AFRICAN AMERICAN: >60
GFR NON-AFRICAN AMERICAN: >60
GLOBULIN: 2 G/DL
GLUCOSE BLD-MCNC: 107 MG/DL (ref 70–99)
HBA1C MFR BLD: 5.9 %
HDLC SERPL-MCNC: 35 MG/DL (ref 40–60)
LDL CHOLESTEROL CALCULATED: 31 MG/DL
POTASSIUM SERPL-SCNC: 4.4 MMOL/L (ref 3.5–5.1)
SODIUM BLD-SCNC: 140 MMOL/L (ref 136–145)
TOTAL PROTEIN: 6.4 G/DL (ref 6.4–8.2)
TRIGL SERPL-MCNC: 48 MG/DL (ref 0–150)
VITAMIN D 25-HYDROXY: 45 NG/ML
VLDLC SERPL CALC-MCNC: 10 MG/DL

## 2020-06-23 ENCOUNTER — VIRTUAL VISIT (OUTPATIENT)
Dept: ENDOCRINOLOGY | Age: 80
End: 2020-06-23
Payer: MEDICARE

## 2020-06-23 PROCEDURE — 99441 PR PHYS/QHP TELEPHONE EVALUATION 5-10 MIN: CPT | Performed by: INTERNAL MEDICINE

## 2020-12-07 RX ORDER — OMEGA-3-ACID ETHYL ESTERS 1 G/1
CAPSULE, LIQUID FILLED ORAL
Qty: 120 CAPSULE | Refills: 0 | Status: SHIPPED | OUTPATIENT
Start: 2020-12-07 | End: 2021-01-08 | Stop reason: SDUPTHER

## 2020-12-07 RX ORDER — ATORVASTATIN CALCIUM 40 MG/1
TABLET, FILM COATED ORAL
Qty: 30 TABLET | Refills: 1 | Status: SHIPPED | OUTPATIENT
Start: 2020-12-07 | End: 2021-02-05 | Stop reason: SDUPTHER

## 2020-12-07 NOTE — TELEPHONE ENCOUNTER
LOV: 6/23/2020    NOV: NONE     DOSE:     Frequency:     Pharmacy as Pended:     Per LOV Note: On lipitor, Lovaza    Per Last PHONE NOTE:     Lab Results   Component Value Date    CHOL 76 06/19/2020    CHOL 90 11/06/2019    CHOL 80 03/06/2019     Lab Results   Component Value Date    TRIG 48 06/19/2020    TRIG 79 11/06/2019    TRIG 57 03/06/2019     Lab Results   Component Value Date    HDL 35 (L) 06/19/2020    HDL 45 11/06/2019    HDL 38 (L) 03/06/2019     Lab Results   Component Value Date    LDLCALC 31 06/19/2020    LDLCALC 29 11/06/2019    LDLCALC 31 03/06/2019     Lab Results   Component Value Date    LABVLDL 10 06/19/2020    LABVLDL 16 11/06/2019    LABVLDL 11 03/06/2019     No results found for: CHOLHDLRATIO

## 2021-01-07 ENCOUNTER — TELEPHONE (OUTPATIENT)
Dept: ENDOCRINOLOGY | Age: 81
End: 2021-01-07

## 2021-01-08 RX ORDER — OMEGA-3-ACID ETHYL ESTERS 1 G/1
CAPSULE, LIQUID FILLED ORAL
Qty: 120 CAPSULE | Refills: 0 | Status: SHIPPED | OUTPATIENT
Start: 2021-01-08 | End: 2021-02-02

## 2021-02-02 DIAGNOSIS — E78.00 PURE HYPERCHOLESTEROLEMIA: ICD-10-CM

## 2021-02-02 RX ORDER — OMEGA-3-ACID ETHYL ESTERS 1 G/1
CAPSULE, LIQUID FILLED ORAL
Qty: 120 CAPSULE | Refills: 5 | Status: SHIPPED | OUTPATIENT
Start: 2021-02-02

## 2021-02-02 NOTE — TELEPHONE ENCOUNTER
Medication:   Requested Prescriptions     Pending Prescriptions Disp Refills    omega-3 acid ethyl esters (LOVAZA) 1 g capsule [Pharmacy Med Name: OMEGA-3 ETHYL ESTERS 1 GM CAP] 120 capsule 0     Sig: TAKE TWO CAPSULES BY MOUTH TWICE A DAY         Last appt: 6/23/2020   Next appt: 6/2/2021    Last Lipid:   Lab Results   Component Value Date    CHOL 76 06/19/2020    TRIG 48 06/19/2020    HDL 35 06/19/2020    LDLCALC 31 06/19/2020

## 2021-02-05 ENCOUNTER — TELEPHONE (OUTPATIENT)
Dept: ENDOCRINOLOGY | Age: 81
End: 2021-02-05

## 2021-02-05 DIAGNOSIS — E78.00 PURE HYPERCHOLESTEROLEMIA: ICD-10-CM

## 2021-02-05 RX ORDER — ATORVASTATIN CALCIUM 40 MG/1
TABLET, FILM COATED ORAL
Qty: 90 TABLET | Refills: 1 | Status: SHIPPED | OUTPATIENT
Start: 2021-02-05

## 2021-02-05 NOTE — TELEPHONE ENCOUNTER
Patient needs a refill on his atorvastatin 40mg      [id number] [address] [place], [place] - [address] [facility] - f [phone]

## 2021-02-05 NOTE — TELEPHONE ENCOUNTER
Medication:   Requested Prescriptions     Pending Prescriptions Disp Refills    atorvastatin (LIPITOR) 40 MG tablet 30 tablet 1     Sig: TAKE ONE TABLET BY MOUTH ONE TIME DAILY       Last Filled:      Patient Phone Number: [phone] (home) [phone] (work)    Last appt: 6/23/2020   Next appt: 6/2/2021    Last Labs DM:   Lab Results   Component Value Date    LABA1C 5.9 06/19/2020

## 2021-06-02 ENCOUNTER — OFFICE VISIT (OUTPATIENT)
Dept: ENDOCRINOLOGY | Age: 81
End: 2021-06-02
Payer: MEDICARE

## 2021-06-02 VITALS
BODY MASS INDEX: 28.06 KG/M2 | HEIGHT: 70 IN | DIASTOLIC BLOOD PRESSURE: 71 MMHG | SYSTOLIC BLOOD PRESSURE: 120 MMHG | WEIGHT: 196 LBS | HEART RATE: 64 BPM

## 2021-06-02 DIAGNOSIS — E78.49 OTHER HYPERLIPIDEMIA: Primary | ICD-10-CM

## 2021-06-02 LAB
A/G RATIO: 2.1 (ref 1.1–2.2)
ALBUMIN SERPL-MCNC: 4.5 G/DL (ref 3.4–5)
ALP BLD-CCNC: 63 U/L (ref 40–129)
ALT SERPL-CCNC: 11 U/L (ref 10–40)
ANION GAP SERPL CALCULATED.3IONS-SCNC: 14 MMOL/L (ref 3–16)
AST SERPL-CCNC: 15 U/L (ref 15–37)
BILIRUB SERPL-MCNC: 1.8 MG/DL (ref 0–1)
BUN BLDV-MCNC: 18 MG/DL (ref 7–20)
CALCIUM SERPL-MCNC: 9.3 MG/DL (ref 8.3–10.6)
CHLORIDE BLD-SCNC: 98 MMOL/L (ref 99–110)
CHOLESTEROL, TOTAL: 79 MG/DL (ref 0–199)
CO2: 25 MMOL/L (ref 21–32)
CREAT SERPL-MCNC: 1 MG/DL (ref 0.8–1.3)
GFR AFRICAN AMERICAN: >60
GFR NON-AFRICAN AMERICAN: >60
GLOBULIN: 2.1 G/DL
GLUCOSE BLD-MCNC: 102 MG/DL (ref 70–99)
HDLC SERPL-MCNC: 36 MG/DL (ref 40–60)
LDL CHOLESTEROL CALCULATED: 30 MG/DL
POTASSIUM SERPL-SCNC: 4.6 MMOL/L (ref 3.5–5.1)
SODIUM BLD-SCNC: 137 MMOL/L (ref 136–145)
TOTAL PROTEIN: 6.6 G/DL (ref 6.4–8.2)
TRIGL SERPL-MCNC: 63 MG/DL (ref 0–150)
VLDLC SERPL CALC-MCNC: 13 MG/DL

## 2021-06-02 PROCEDURE — 1036F TOBACCO NON-USER: CPT | Performed by: INTERNAL MEDICINE

## 2021-06-02 PROCEDURE — G8427 DOCREV CUR MEDS BY ELIG CLIN: HCPCS | Performed by: INTERNAL MEDICINE

## 2021-06-02 PROCEDURE — 1123F ACP DISCUSS/DSCN MKR DOCD: CPT | Performed by: INTERNAL MEDICINE

## 2021-06-02 PROCEDURE — G8417 CALC BMI ABV UP PARAM F/U: HCPCS | Performed by: INTERNAL MEDICINE

## 2021-06-02 PROCEDURE — 99214 OFFICE O/P EST MOD 30 MIN: CPT | Performed by: INTERNAL MEDICINE

## 2021-06-02 PROCEDURE — 4040F PNEUMOC VAC/ADMIN/RCVD: CPT | Performed by: INTERNAL MEDICINE

## 2021-06-02 NOTE — PROGRESS NOTES
tablet TAKE 1 TABLET BY MOUTH DAILY. 90 tablet 1    spironolactone-hydrochlorothiazide (ALDACTAZIDE) 25-25 MG per tablet       omeprazole (PRILOSEC) 20 MG capsule       allopurinol (ZYLOPRIM) 100 MG tablet Take 1 tablet by mouth 2 times daily.  Ascorbic Acid (VITAMIN C) 500 MG tablet Take 500 mg by mouth daily.  Cholecalciferol (VITAMIN D) 1000 UNIT CAPS Take 1,000 Units by mouth daily.  folic acid (FOLVITE) 1 MG tablet Take 1 mg by mouth daily       carvedilol (COREG) 25 MG tablet Take 25 mg by mouth 2 times daily (with meals). No current facility-administered medications for this visit. ROS: Scanned, reviewed    Vitals:    06/02/21 1040   BP: 120/71   Pulse: 64       Constitutional: Well-developed, appears stated age, cooperative, in no acute distress  H/E/N/M/T:atraumatic, normocephalic, external ears, nose, lips normal without lesions  Eyes: Lids, lashes, conjunctivae and sclerae normal, No proptosis, no redness  Neck: supple, symmetrical, no swelling  Skin: No obvious rashes or lesions present. Skin and hair texture normal  Psychiatric: Judgement and Insight:  judgement and insight appear normal  Neuro: Normal without focal findings, speech is normal normal, speech is spontaneous  Chest: No labored breathing, no chest deformity, no stridor  Musculoskeletal: No joint deformity, swelling      No visits with results within 1 Month(s) from this visit. Latest known visit with results is:   Orders Only on 06/19/2020   Component Date Value Ref Range Status    Hemoglobin A1C 06/19/2020 5.9  See comment % Final    Comment: Comment:  Diagnosis of Diabetes: > or = 6.5%  Increased risk of diabetes (Prediabetes): 5.7-6.4%  Glycemic Control: Nonpregnant Adults: <7.0%                    Pregnant: <6.0%        eAG 06/19/2020 122.6  mg/dL Final    Vit D, 25-Hydroxy 06/19/2020 45.0  >=30 ng/mL Final    Comment: <=20 ng/mL. ........... [facility] Deficient  21-29 ng/mL. ......... [facility] Insufficient  >=30 ng/mL......... [place] [facility] Sufficient      Sodium 06/19/2020 140  136 - 145 mmol/L Final    Potassium 06/19/2020 4.4  3.5 - 5.1 mmol/L Final    Chloride 06/19/2020 100  99 - 110 mmol/L Final    CO2 06/19/2020 26  21 - 32 mmol/L Final    Anion Gap 06/19/2020 14  3 - 16 Final    Glucose 06/19/2020 107* 70 - 99 mg/dL Final    BUN 06/19/2020 22* 7 - 20 mg/dL Final    CREATININE 06/19/2020 0.9  0.8 - 1.3 mg/dL Final    GFR Non- 06/19/2020 >60  >60 Final    Comment: >60 mL/min/1.73m2 EGFR, calc. for ages 25 and older using the  MDRD formula (not corrected for weight), is valid for stable  renal function.  GFR  06/19/2020 >60  >60 Final    Comment: Chronic Kidney Disease: less than 60 ml/min/1.73 sq.m. Kidney Failure: less than 15 ml/min/1.73 sq.m. Results valid for patients 18 years and older.  Calcium 06/19/2020 9.1  8.3 - 10.6 mg/dL Final    Total Protein 06/19/2020 6.4  6.4 - 8.2 g/dL Final    Albumin 06/19/2020 4.4  3.4 - 5.0 g/dL Final    Albumin/Globulin Ratio 06/19/2020 2.2  1.1 - 2.2 Final    Total Bilirubin 06/19/2020 1.3* 0.0 - 1.0 mg/dL Final    Alkaline Phosphatase 06/19/2020 70  40 - 129 U/L Final    ALT 06/19/2020 11  10 - 40 U/L Final    AST 06/19/2020 14* 15 - 37 U/L Final    Globulin 06/19/2020 2.0  g/dL Final    Cholesterol, Total 06/19/2020 76  0 - 199 mg/dL Final    Triglycerides 06/19/2020 48  0 - 150 mg/dL Final    HDL 06/19/2020 35* 40 - 60 mg/dL Final    LDL Calculated 06/19/2020 31  <100 mg/dL Final    VLDL Cholesterol Calculated 06/19/2020 10  Not Established mg/dL Final       Assessment/Plan      1. Hyperlipidemia    This [de-identified] yrs old male has h/o hyperlipidemia. On lipitor, Lovaza    Stable    Needs levels    2. Vit D def On replacement. Levels normal ( 35.9 ) in 02/18---> 35.6 ( 03/19)---> 45    3. Impaired fasting glucose. A1c 5.9 % ---> 5.9 % ---> 5.8 % --> 5.9 %       4. Hyperuricemia. On allopurinol.    Uric acid 4.4 ---> 4.8     5. CAD/Afib/prothrombin gene mutation. On xarelto. As per cardiology. 6. Sleep Apnea. As per pulmonology medicine. 7. Dermatitis/Eczema. On tacrolimus, kenalog. He is living in [place] now. Is seen when he is in town.

## 2022-06-24 ENCOUNTER — TELEMEDICINE (OUTPATIENT)
Dept: ENDOCRINOLOGY | Age: 82
End: 2022-06-24
Payer: MEDICARE

## 2022-06-24 DIAGNOSIS — E78.00 PURE HYPERCHOLESTEROLEMIA: Primary | ICD-10-CM

## 2022-06-24 DIAGNOSIS — E78.00 PURE HYPERCHOLESTEROLEMIA: ICD-10-CM

## 2022-06-24 DIAGNOSIS — E55.9 VITAMIN D DEFICIENCY: ICD-10-CM

## 2022-06-24 DIAGNOSIS — R73.01 IMPAIRED FASTING BLOOD SUGAR: ICD-10-CM

## 2022-06-24 LAB
ANION GAP SERPL CALCULATED.3IONS-SCNC: 10 MMOL/L (ref 3–16)
BUN BLDV-MCNC: 15 MG/DL (ref 7–20)
CALCIUM SERPL-MCNC: 9.7 MG/DL (ref 8.3–10.6)
CHLORIDE BLD-SCNC: 99 MMOL/L (ref 99–110)
CHOLESTEROL, TOTAL: 93 MG/DL (ref 0–199)
CO2: 27 MMOL/L (ref 21–32)
CREAT SERPL-MCNC: 0.9 MG/DL (ref 0.8–1.3)
GFR AFRICAN AMERICAN: >60
GFR NON-AFRICAN AMERICAN: >60
GLUCOSE BLD-MCNC: 101 MG/DL (ref 70–99)
HDLC SERPL-MCNC: 35 MG/DL (ref 40–60)
LDL CHOLESTEROL CALCULATED: 41 MG/DL
POTASSIUM SERPL-SCNC: 4.8 MMOL/L (ref 3.5–5.1)
SODIUM BLD-SCNC: 136 MMOL/L (ref 136–145)
TRIGL SERPL-MCNC: 85 MG/DL (ref 0–150)
VLDLC SERPL CALC-MCNC: 17 MG/DL

## 2022-06-24 PROCEDURE — 99214 OFFICE O/P EST MOD 30 MIN: CPT | Performed by: INTERNAL MEDICINE

## 2022-06-24 PROCEDURE — G8427 DOCREV CUR MEDS BY ELIG CLIN: HCPCS | Performed by: INTERNAL MEDICINE

## 2022-06-24 PROCEDURE — 1123F ACP DISCUSS/DSCN MKR DOCD: CPT | Performed by: INTERNAL MEDICINE

## 2022-06-24 NOTE — PROGRESS NOTES
Patient was evaluated through a synchronous (real-time) audio-video  encounter. The patient (or guardian if applicable) is aware that this is a billable service, which includes applicable co-pays. This Virtual Visit was  conducted with patient's (and/or legal guardian's) consent. The visit was conducted pursuant to the emergency declaration under the [facility], [facility] authority and the [employer] General Act. Patient identification was verified,  and a caregiver was present when appropriate. The patient was located in a state where the provider was licensed to provide care. HPI    This is a [de-identified] yrs old male who is here for a follow-up for management of hyperlipidemia, vit d def, hyperuricemia. Was previously seeing Dr. Bro Ozuna at the [facility]. Has seen Dr. Janett Miner    Seen as new patient    He has a PMH of  A. Fibrillation (On Xarelto), Left Ventricular dysfunction (s/p pacemaker), CAD (MI, LAD 1979, then angioplasty 1986), KARISSA (CPAP/BIPAP), Hyperuricemia.      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 On Xarelto 20mg qD. No strokes/TIA.     No myalgias, CP and SOB, GI symptoms.     Hyperlipidemia : he has mixed hyperlipidemia  On Lovaza 4g daily, Lipitor 40mg daily,     For years    Mild, controlled    02/18---> 03/19---> 11/19--> 06/20  HDL 41----> 38---> 45---> 35  LDL 42---> 31----> 29---> 31  TGD 60---> 57----> 79-->48    Vit D def. On Vit D 8279 IU daily,   Folic acid 1mg daily. Hyperuricemia : allopurinol. Impaired fasting glucose : On life style modification  Has lost weight.      Past Medical History:   Diagnosis Date    Atrial fibrillation [facility]) 2000    pacemaker    CAD (coronary artery disease)     angiogram 12/2010 no stents    Coagulopathy (HCC)     4G/5G ELISSA-1, Heterozygous Prothrombin    Hyperlipidemia     Hypertension     MI (myocardial infarction) ([facility] [address] 1979  Unspecified sleep apnea      Past Surgical History:   Procedure Laterality Date    ANGIOPLASTY  1986    CARDIAC CATHETERIZATION  12/2010     Current Outpatient Medications   Medication Sig Dispense Refill    atorvastatin (LIPITOR) 40 MG tablet TAKE ONE TABLET BY MOUTH ONE TIME DAILY 90 tablet 1    omega-3 acid ethyl esters (LOVAZA) 1 g capsule TAKE TWO CAPSULES BY MOUTH TWICE A  capsule 5    cetirizine (ZYRTEC) 10 MG tablet Take 10 mg by mouth daily      ranitidine (ZANTAC) 150 MG tablet Take 150 mg by mouth 2 times daily      lisinopril (PRINIVIL;ZESTRIL) 10 MG tablet Take 1 tablet by mouth daily 30 tablet 0    rivaroxaban (XARELTO) 20 MG TABS tablet TAKE 1 TABLET BY MOUTH DAILY. 90 tablet 1    spironolactone-hydrochlorothiazide (ALDACTAZIDE) 25-25 MG per tablet       omeprazole (PRILOSEC) 20 MG capsule       allopurinol (ZYLOPRIM) 100 MG tablet Take 1 tablet by mouth 2 times daily.  Ascorbic Acid (VITAMIN C) 500 MG tablet Take 500 mg by mouth daily.  Cholecalciferol (VITAMIN D) 1000 UNIT CAPS Take 1,000 Units by mouth daily.  folic acid (FOLVITE) 1 MG tablet Take 1 mg by mouth daily       carvedilol (COREG) 25 MG tablet Take 25 mg by mouth 2 times daily (with meals). No current facility-administered medications for this visit. Review of Systems   Constitutional: Positive for malaise/fatigue. Negative for chills, diaphoresis, fever and weight loss. Eyes: Negative for blurred vision, double vision and photophobia. Respiratory: Negative for cough and hemoptysis. Cardiovascular: Negative for chest pain, palpitations and orthopnea. Genitourinary: Negative for dysuria, flank pain, frequency, hematuria and urgency. Musculoskeletal: Negative for back pain, falls, joint pain, myalgias and neck pain. Skin: Negative for itching and rash.    Neurological: Negative for dizziness, tingling, tremors, sensory change, speech change, focal weakness, seizures, loss of consciousness and headaches. Endo/Heme/Allergies: Negative for environmental allergies and polydipsia. Does not bruise/bleed easily. Psychiatric/Behavioral: Negative for depression, hallucinations, memory loss, substance abuse and suicidal ideas. The patient is not nervous/anxious and does not have insomnia. PHYSICAL EXAMINATION:  [ INSTRUCTIONS:  \"[x]\" Indicates a positive item  \"[]\" Indicates a negative item  -- DELETE ALL ITEMS NOT EXAMINED]  Vital Signs: (As obtained by patient/caregiver or practitioner observation)    Blood pressure-  Heart rate-    Respiratory rate-    Temperature-  Pulse oximetry-     Constitutional Appears well-developed and well-nourished No apparent distress        Mental status  Alert and awake  Oriented to person/place/time  Able to follow commands      Eyes:  EOM    [x]  Normal    Sclera  [x]  Normal           Discharge [x]  None visible      HENT:   [x] Normocephalic, atraumatic. [x] Mouth/Throat: Mucous membranes are moist.     External Ears [x] Normal  no discharge    Neck: [x] No visualized mass  no swelling    Pulmonary/Chest: [x] Respiratory effort normal.  [x] No visualized signs of difficulty breathing or respiratory distress             Musculoskeletal:   [x] Normal gait with no signs of ataxia         [x] Normal range of motion of neck          Head and neck stable, appears normal ROM, strength good    Neurological:        [x] No Facial Asymmetry (Cranial nerve 7 motor function) (limited exam to video visit)          [x] No gaze palsy                Skin:        [x] No significant exanthematous lesions or discoloration noted on facial skin                 Psychiatric:       [x] Normal Affect [x] No Hallucinations            Other pertinent observable physical exam findings-     Due to this being a TeleHealth encounter, evaluation of the following organ systems is limited: Vitals/Constitutional/EENT/Resp/CV/GI//MS/Neuro/Skin/Heme-Lymph-Imm.       Services were provided through a video synchronous discussion virtually to substitute for in-person clinic visit. No visits with results within 1 Month(s) from this visit. Latest known visit with results is:   Office Visit on 06/02/2021   Component Date Value Ref Range Status    Sodium 06/02/2021 137  136 - 145 mmol/L Final    Potassium 06/02/2021 4.6  3.5 - 5.1 mmol/L Final    Chloride 06/02/2021 98* 99 - 110 mmol/L Final    CO2 06/02/2021 25  21 - 32 mmol/L Final    Anion Gap 06/02/2021 14  3 - 16 Final    Glucose 06/02/2021 102* 70 - 99 mg/dL Final    BUN 06/02/2021 18  7 - 20 mg/dL Final    CREATININE 06/02/2021 1.0  0.8 - 1.3 mg/dL Final    GFR Non- 06/02/2021 >60  >60 Final    Comment: >60 mL/min/1.73m2 EGFR, calc. for ages 25 and older using the  MDRD formula (not corrected for weight), is valid for stable  renal function.  GFR  06/02/2021 >60  >60 Final    Comment: Chronic Kidney Disease: less than 60 ml/min/1.73 sq.m. Kidney Failure: less than 15 ml/min/1.73 sq.m. Results valid for patients 18 years and older.  Calcium 06/02/2021 9.3  8.3 - 10.6 mg/dL Final    Total Protein 06/02/2021 6.6  6.4 - 8.2 g/dL Final    Albumin 06/02/2021 4.5  3.4 - 5.0 g/dL Final    Albumin/Globulin Ratio 06/02/2021 2.1  1.1 - 2.2 Final    Total Bilirubin 06/02/2021 1.8* 0.0 - 1.0 mg/dL Final    Alkaline Phosphatase 06/02/2021 63  40 - 129 U/L Final    ALT 06/02/2021 11  10 - 40 U/L Final    AST 06/02/2021 15  15 - 37 U/L Final    Globulin 06/02/2021 2.1  g/dL Final    Cholesterol, Total 06/02/2021 79  0 - 199 mg/dL Final    Triglycerides 06/02/2021 63  0 - 150 mg/dL Final    HDL 06/02/2021 36* 40 - 60 mg/dL Final    LDL Calculated 06/02/2021 30  <100 mg/dL Final    VLDL Cholesterol Calculated 06/02/2021 13  Not Established mg/dL Final       Assessment/Plan      1. Hyperlipidemia    This [de-identified] yrs old male has h/o hyperlipidemia.      On lipitor, Lovaza    Stable    Needs levels    2. Vit D def On replacement. Levels normal ( 35.9 ) in 02/18---> 35.6 ( 03/19)---> 45    3. Impaired fasting glucose. A1c 5.9 % ---> 5.9 % ---> 5.8 % --> 5.9 %       4. Hyperuricemia. On allopurinol. Uric acid 4.4 ---> 4.8     5. CAD/Afib/prothrombin gene mutation. On xarelto. As per cardiology. 6. Sleep Apnea. As per pulmonology medicine. 7. Dermatitis/Eczema. On tacrolimus, kenalog. He is living in [place] now. Is seen when he is in town.

## 2022-06-25 LAB
ESTIMATED AVERAGE GLUCOSE: 122.6 MG/DL
HBA1C MFR BLD: 5.9 %

## 2023-06-09 DIAGNOSIS — R73.01 IMPAIRED FASTING BLOOD SUGAR: ICD-10-CM

## 2023-06-09 DIAGNOSIS — E78.00 PURE HYPERCHOLESTEROLEMIA: ICD-10-CM

## 2023-06-09 LAB
ANION GAP SERPL CALCULATED.3IONS-SCNC: 11 MMOL/L (ref 3–16)
BUN SERPL-MCNC: 18 MG/DL (ref 7–20)
CALCIUM SERPL-MCNC: 9.1 MG/DL (ref 8.3–10.6)
CHLORIDE SERPL-SCNC: 101 MMOL/L (ref 99–110)
CO2 SERPL-SCNC: 26 MMOL/L (ref 21–32)
CREAT SERPL-MCNC: 0.9 MG/DL (ref 0.8–1.3)
GFR SERPLBLD CREATININE-BSD FMLA CKD-EPI: >60 ML/MIN/{1.73_M2}
GLUCOSE SERPL-MCNC: 108 MG/DL (ref 70–99)
POTASSIUM SERPL-SCNC: 4.8 MMOL/L (ref 3.5–5.1)
SODIUM SERPL-SCNC: 138 MMOL/L (ref 136–145)

## 2023-06-10 LAB
EST. AVERAGE GLUCOSE BLD GHB EST-MCNC: 122.6 MG/DL
HBA1C MFR BLD: 5.9 %

## 2023-07-21 ENCOUNTER — TELEMEDICINE (OUTPATIENT)
Dept: ENDOCRINOLOGY | Age: 83
End: 2023-07-21

## 2023-07-21 DIAGNOSIS — R73.01 IMPAIRED FASTING BLOOD SUGAR: Primary | ICD-10-CM

## 2023-07-24 ENCOUNTER — OFFICE VISIT (OUTPATIENT)
Dept: ENDOCRINOLOGY | Age: 83
End: 2023-07-24
Payer: MEDICARE

## 2023-07-24 VITALS
DIASTOLIC BLOOD PRESSURE: 68 MMHG | RESPIRATION RATE: 14 BRPM | HEART RATE: 78 BPM | HEIGHT: 70 IN | TEMPERATURE: 98 F | WEIGHT: 186.2 LBS | OXYGEN SATURATION: 98 % | BODY MASS INDEX: 26.66 KG/M2 | SYSTOLIC BLOOD PRESSURE: 117 MMHG

## 2023-07-24 DIAGNOSIS — E78.49 OTHER HYPERLIPIDEMIA: Primary | ICD-10-CM

## 2023-07-24 DIAGNOSIS — R73.01 IMPAIRED FASTING BLOOD SUGAR: ICD-10-CM

## 2023-07-24 PROCEDURE — 3078F DIAST BP <80 MM HG: CPT | Performed by: INTERNAL MEDICINE

## 2023-07-24 PROCEDURE — G8427 DOCREV CUR MEDS BY ELIG CLIN: HCPCS | Performed by: INTERNAL MEDICINE

## 2023-07-24 PROCEDURE — 1123F ACP DISCUSS/DSCN MKR DOCD: CPT | Performed by: INTERNAL MEDICINE

## 2023-07-24 PROCEDURE — 1036F TOBACCO NON-USER: CPT | Performed by: INTERNAL MEDICINE

## 2023-07-24 PROCEDURE — 99213 OFFICE O/P EST LOW 20 MIN: CPT | Performed by: INTERNAL MEDICINE

## 2023-07-24 PROCEDURE — G8419 CALC BMI OUT NRM PARAM NOF/U: HCPCS | Performed by: INTERNAL MEDICINE

## 2023-07-24 PROCEDURE — 3074F SYST BP LT 130 MM HG: CPT | Performed by: INTERNAL MEDICINE

## 2023-07-24 NOTE — PROGRESS NOTES
HPI    This is a 80 yrs old male who is here for a follow-up for management of hyperlipidemia, vit d def, hyperuricemia. Was previously seeing Dr. Devendra Henderson at the [facility]. Interim:    Stable      Has seen Dr. Soliman Late      He has a PMH of  A. Fibrillation (On Xarelto), Left Ventricular dysfunction (s/p pacemaker), CAD (MI, LAD 1979, then angioplasty 1986), KARISSA (CPAP/BIPAP), Hyperuricemia. 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 On Xarelto 20mg qD. No strokes/TIA. No myalgias, CP and SOB, GI symptoms. Hyperlipidemia : he has mixed hyperlipidemia  On Lovaza 4g daily, Lipitor 40mg daily,     For years    Mild, controlled    02/18---> 03/19---> 11/19--> 06/20  HDL 41----> 38---> 45---> 35  LDL 42---> 31----> 29---> 31---> 37  TGD 60---> 57----> 79-->48    Vit D def. On Vit D 1567 IU daily,   Folic acid 1mg daily. Hyperuricemia : allopurinol. Impaired fasting glucose : On life style modification  Has lost weight.        Past Medical History:   Diagnosis Date    Atrial fibrillation ([address]) 2000    pacemaker    CAD (coronary artery disease)     angiogram 12/2010 no stents    Coagulopathy (HCC)     4G/5G ELISSA-1, Heterozygous Prothrombin    Hyperlipidemia     Hypertension     MI (myocardial infarction) ([address]) 1979    Unspecified sleep apnea      Past Surgical History:   Procedure Laterality Date    ANGIOPLASTY  1986    CARDIAC CATHETERIZATION  12/2010     Current Outpatient Medications   Medication Sig Dispense Refill    atorvastatin (LIPITOR) 40 MG tablet TAKE ONE TABLET BY MOUTH ONE TIME DAILY 90 tablet 1    omega-3 acid ethyl esters (LOVAZA) 1 g capsule TAKE TWO CAPSULES BY MOUTH TWICE A  capsule 5    cetirizine (ZYRTEC) 10 MG tablet Take 1 tablet by mouth daily      ranitidine (ZANTAC) 150 MG tablet Take 1 tablet by mouth 2 times daily      lisinopril (PRINIVIL;ZESTRIL) 10 MG tablet Take 1 tablet by mouth daily 30 tablet 0    rivaroxaban (XARELTO) 20 MG

## 2024-09-04 ENCOUNTER — OFFICE VISIT (OUTPATIENT)
Dept: ENDOCRINOLOGY | Age: 84
End: 2024-09-04

## 2024-09-04 VITALS
RESPIRATION RATE: 14 BRPM | HEIGHT: 70 IN | DIASTOLIC BLOOD PRESSURE: 58 MMHG | WEIGHT: 175 LBS | TEMPERATURE: 98 F | SYSTOLIC BLOOD PRESSURE: 91 MMHG | HEART RATE: 69 BPM | BODY MASS INDEX: 25.05 KG/M2

## 2024-09-04 DIAGNOSIS — R73.01 IMPAIRED FASTING BLOOD SUGAR: ICD-10-CM

## 2024-09-04 DIAGNOSIS — E55.9 VITAMIN D DEFICIENCY: ICD-10-CM

## 2024-09-04 DIAGNOSIS — E78.49 OTHER HYPERLIPIDEMIA: Primary | ICD-10-CM

## 2024-09-04 NOTE — PROGRESS NOTES
HPI    This is a 80 yrs old male who is here for a follow-up for management of hyperlipidemia, vit d def, hyperuricemia.     Was previously seeing Dr. Matthew at the [facility].     Interim:    Stable      Has seen Dr. Guevara      He has a PMH of  A. Fibrillation (On Xarelto), Left Ventricular dysfunction (s/p pacemaker), CAD (MI, LAD 1979, then angioplasty 1986), KARISSA (CPAP/BIPAP), Hyperuricemia.      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 On Xarelto 20mg qD.  No strokes/TIA.     No myalgias, CP and SOB, GI symptoms.     Hyperlipidemia : he has mixed hyperlipidemia  On Lovaza 4g daily, Lipitor 40mg daily,     For years    Mild, controlled    02/18---> 03/19---> 11/19--> 06/20  HDL 41----> 38---> 45---> 35  LDL 42---> 31----> 29---> 31---> 37---> 29  TGD 60---> 57----> 79-->48    Vit D def. On Vit D 1000 IU daily,   Folic acid 1mg daily.  Stable    Hyperuricemia : allopurinol.     Impaired fasting glucose : On life style modification  Has lost weight.       Past Medical History:   Diagnosis Date    Atrial fibrillation (HCC) 2000    pacemaker    CAD (coronary artery disease)     angiogram 12/2010 no stents    Coagulopathy (HCC)     4G/5G ELISSA-1, Heterozygous Prothrombin    Hyperlipidemia     Hypertension     MI (myocardial infarction) (HCC) 1979    Unspecified sleep apnea      Past Surgical History:   Procedure Laterality Date    ANGIOPLASTY  1986    CARDIAC CATHETERIZATION  12/2010     Current Outpatient Medications   Medication Sig Dispense Refill    atorvastatin (LIPITOR) 40 MG tablet TAKE ONE TABLET BY MOUTH ONE TIME DAILY 90 tablet 1    omega-3 acid ethyl esters (LOVAZA) 1 g capsule TAKE TWO CAPSULES BY MOUTH TWICE A  capsule 5    cetirizine (ZYRTEC) 10 MG tablet Take 1 tablet by mouth daily      ranitidine (ZANTAC) 150 MG tablet Take 1 tablet by mouth 2 times daily      lisinopril (PRINIVIL;ZESTRIL) 10 MG tablet Take 1 tablet by mouth daily 30 tablet 0    rivaroxaban

## 2025-08-11 ENCOUNTER — TELEPHONE (OUTPATIENT)
Dept: ENDOCRINOLOGY | Age: 85
End: 2025-08-11